# Patient Record
Sex: FEMALE | Race: ASIAN | URBAN - METROPOLITAN AREA
[De-identification: names, ages, dates, MRNs, and addresses within clinical notes are randomized per-mention and may not be internally consistent; named-entity substitution may affect disease eponyms.]

---

## 2023-03-15 ENCOUNTER — TELEPHONE (OUTPATIENT)
Dept: PRIMARY CARE | Facility: CLINIC | Age: 58
End: 2023-03-15
Payer: COMMERCIAL

## 2023-03-15 NOTE — TELEPHONE ENCOUNTER
----- Message from Teddy Pennington DO sent at 3/14/2023 10:02 AM EDT -----  Regarding: CT head  Please notify patient of normal CAT scan of the head.  ----- Message -----  From: [facility] - Radiology Results In  Sent: 3/8/2023  11:41 AM EDT  To: Teddy Pennington DO

## 2023-03-21 ENCOUNTER — TELEPHONE (OUTPATIENT)
Dept: PRIMARY CARE | Facility: CLINIC | Age: 58
End: 2023-03-21
Payer: COMMERCIAL

## 2023-03-22 NOTE — TELEPHONE ENCOUNTER
Please let her know that she needs a total of 3 shots before we check her B12 level again.  Thank you.

## 2023-03-24 ENCOUNTER — CLINICAL SUPPORT (OUTPATIENT)
Dept: PRIMARY CARE | Facility: CLINIC | Age: 58
End: 2023-03-24
Payer: COMMERCIAL

## 2023-03-24 DIAGNOSIS — E53.8 B12 DEFICIENCY: Primary | ICD-10-CM

## 2023-03-24 PROBLEM — R31.9 HEMATURIA: Status: ACTIVE | Noted: 2023-03-24

## 2023-03-24 PROBLEM — R42 VERTIGO: Status: ACTIVE | Noted: 2023-03-24

## 2023-03-24 PROBLEM — K60.2 ANAL FISSURE: Status: ACTIVE | Noted: 2023-03-24

## 2023-03-24 PROBLEM — R73.03 PREDIABETES: Status: ACTIVE | Noted: 2023-03-24

## 2023-03-24 PROBLEM — M54.2 NECK PAIN: Status: ACTIVE | Noted: 2023-03-24

## 2023-03-24 PROBLEM — J10.1 INFLUENZA B: Status: ACTIVE | Noted: 2023-03-24

## 2023-03-24 PROBLEM — M54.50 CHRONIC LOW BACK PAIN: Status: ACTIVE | Noted: 2023-03-24

## 2023-03-24 PROBLEM — R42 DIZZINESS: Status: ACTIVE | Noted: 2023-03-24

## 2023-03-24 PROBLEM — G89.29 CHRONIC LOW BACK PAIN: Status: ACTIVE | Noted: 2023-03-24

## 2023-03-24 PROBLEM — J32.9 SINUSITIS: Status: ACTIVE | Noted: 2023-03-24

## 2023-03-24 PROBLEM — K59.09 CHRONIC CONSTIPATION: Status: ACTIVE | Noted: 2023-03-24

## 2023-03-24 PROBLEM — R10.84 GENERALIZED ABDOMINAL PAIN: Status: ACTIVE | Noted: 2023-03-24

## 2023-03-24 PROBLEM — R10.32 LEFT LOWER QUADRANT ABDOMINAL PAIN OF UNKNOWN ETIOLOGY: Status: ACTIVE | Noted: 2023-03-24

## 2023-03-24 PROBLEM — T75.3XXA MOTION SICKNESS: Status: ACTIVE | Noted: 2023-03-24

## 2023-03-24 PROBLEM — H81.10 BPV (BENIGN POSITIONAL VERTIGO): Status: ACTIVE | Noted: 2023-03-24

## 2023-03-24 PROBLEM — N39.0 UTI (URINARY TRACT INFECTION): Status: ACTIVE | Noted: 2023-03-24

## 2023-03-24 PROBLEM — R53.81 MALAISE AND FATIGUE: Status: ACTIVE | Noted: 2023-03-24

## 2023-03-24 PROBLEM — M77.8 TENDINITIS OF RIGHT WRIST: Status: ACTIVE | Noted: 2023-03-24

## 2023-03-24 PROBLEM — I83.819 VARICOSE VEINS WITH PAIN: Status: ACTIVE | Noted: 2023-03-24

## 2023-03-24 PROBLEM — R53.83 MALAISE AND FATIGUE: Status: ACTIVE | Noted: 2023-03-24

## 2023-03-24 PROBLEM — H93.19 TINNITUS: Status: ACTIVE | Noted: 2023-03-24

## 2023-03-24 PROBLEM — N76.0 VAGINITIS: Status: ACTIVE | Noted: 2023-03-24

## 2023-03-24 PROBLEM — M79.662 PAIN AND SWELLING OF LEFT LOWER LEG: Status: ACTIVE | Noted: 2023-03-24

## 2023-03-24 PROBLEM — I45.10 INCOMPLETE RIGHT BUNDLE BRANCH BLOCK (RBBB): Status: ACTIVE | Noted: 2023-03-24

## 2023-03-24 PROBLEM — R26.89 IMBALANCE: Status: ACTIVE | Noted: 2023-03-24

## 2023-03-24 PROBLEM — M79.89 PAIN AND SWELLING OF LEFT LOWER LEG: Status: ACTIVE | Noted: 2023-03-24

## 2023-03-24 PROBLEM — K64.4 HEMORRHOIDAL SKIN TAG: Status: ACTIVE | Noted: 2023-03-24

## 2023-03-24 PROBLEM — R73.9 HYPERGLYCEMIA: Status: ACTIVE | Noted: 2023-03-24

## 2023-03-24 PROBLEM — R00.2 PALPITATION: Status: ACTIVE | Noted: 2023-03-24

## 2023-03-24 PROBLEM — E55.9 VITAMIN D DEFICIENCY: Status: ACTIVE | Noted: 2023-03-24

## 2023-03-24 PROBLEM — E78.5 HYPERLIPIDEMIA: Status: ACTIVE | Noted: 2023-03-24

## 2023-03-24 PROBLEM — M25.531 RIGHT WRIST PAIN: Status: ACTIVE | Noted: 2023-03-24

## 2023-03-24 PROBLEM — J34.89 RHINORRHEA: Status: ACTIVE | Noted: 2023-03-24

## 2023-03-24 PROBLEM — R60.0 BILATERAL LOWER EXTREMITY EDEMA: Status: ACTIVE | Noted: 2023-03-24

## 2023-03-24 PROBLEM — M25.512 LEFT SHOULDER PAIN: Status: ACTIVE | Noted: 2023-03-24

## 2023-03-24 PROBLEM — M79.89 SWELLING OF LEFT LOWER EXTREMITY: Status: ACTIVE | Noted: 2023-03-24

## 2023-03-24 PROBLEM — M79.651 PAIN OF RIGHT LATERAL UPPER THIGH: Status: ACTIVE | Noted: 2023-03-24

## 2023-03-24 PROBLEM — E03.9 HYPOTHYROIDISM: Status: ACTIVE | Noted: 2023-03-24

## 2023-03-24 PROBLEM — E61.1 IRON DEFICIENCY: Status: ACTIVE | Noted: 2023-03-24

## 2023-03-24 PROBLEM — R50.9 FEVER: Status: ACTIVE | Noted: 2023-03-24

## 2023-03-24 PROCEDURE — 96372 THER/PROPH/DIAG INJ SC/IM: CPT | Performed by: FAMILY MEDICINE

## 2023-03-24 RX ORDER — LEVOTHYROXINE SODIUM 75 UG/1
75 TABLET ORAL
COMMUNITY
Start: 2015-01-02 | End: 2023-07-06

## 2023-03-24 RX ORDER — CYANOCOBALAMIN 1000 UG/ML
1000 INJECTION, SOLUTION INTRAMUSCULAR; SUBCUTANEOUS ONCE
Status: COMPLETED | OUTPATIENT
Start: 2023-03-24 | End: 2023-03-24

## 2023-03-24 RX ORDER — TIZANIDINE 2 MG/1
2 TABLET ORAL NIGHTLY
COMMUNITY
Start: 2023-03-02 | End: 2023-11-02 | Stop reason: ALTCHOICE

## 2023-03-24 RX ORDER — HYDROXOCOBALAMIN 1000 UG/ML
INJECTION, SOLUTION INTRAMUSCULAR
COMMUNITY
Start: 2015-01-16 | End: 2023-11-02 | Stop reason: ALTCHOICE

## 2023-03-24 RX ORDER — MECLIZINE HYDROCHLORIDE 25 MG/1
25 TABLET ORAL EVERY 6 HOURS PRN
COMMUNITY
Start: 2023-02-03 | End: 2023-11-02 | Stop reason: ALTCHOICE

## 2023-03-24 RX ORDER — CYCLOSPORINE 0.5 MG/ML
1 EMULSION OPHTHALMIC 2 TIMES DAILY
COMMUNITY
Start: 2013-01-16

## 2023-03-24 RX ORDER — LEVOTHYROXINE SODIUM 50 UG/1
50 TABLET ORAL
COMMUNITY
Start: 2020-09-30 | End: 2023-08-02 | Stop reason: ALTCHOICE

## 2023-03-24 RX ORDER — ATORVASTATIN CALCIUM 20 MG/1
1 TABLET, FILM COATED ORAL EVERY EVENING
COMMUNITY
Start: 2017-07-07 | End: 2023-05-04

## 2023-03-24 RX ADMIN — CYANOCOBALAMIN 1000 MCG: 1000 INJECTION, SOLUTION INTRAMUSCULAR; SUBCUTANEOUS at 10:36

## 2023-03-24 NOTE — PROGRESS NOTES
Keyana Trent presented in office for b12 #2 nurse visit. Pt tolerated well, no side effects. Overseeing provider was Dr. Pennington.

## 2023-03-27 ENCOUNTER — APPOINTMENT (OUTPATIENT)
Dept: PRIMARY CARE | Facility: CLINIC | Age: 58
End: 2023-03-27
Payer: COMMERCIAL

## 2023-03-31 ENCOUNTER — OFFICE VISIT (OUTPATIENT)
Dept: PRIMARY CARE | Facility: CLINIC | Age: 58
End: 2023-03-31
Payer: COMMERCIAL

## 2023-03-31 VITALS
TEMPERATURE: 97.3 F | HEART RATE: 65 BPM | OXYGEN SATURATION: 96 % | BODY MASS INDEX: 23.23 KG/M2 | SYSTOLIC BLOOD PRESSURE: 127 MMHG | DIASTOLIC BLOOD PRESSURE: 82 MMHG | WEIGHT: 127 LBS | RESPIRATION RATE: 18 BRPM

## 2023-03-31 DIAGNOSIS — E01.0 THYROMEGALY: Primary | ICD-10-CM

## 2023-03-31 PROCEDURE — 99213 OFFICE O/P EST LOW 20 MIN: CPT | Performed by: FAMILY MEDICINE

## 2023-03-31 NOTE — PROGRESS NOTES
Subjective   Patient ID: Keyana Trent is a 57 y.o. female who presents for Edema (Left side collar bone swelling since this morning,not painful but noticeable).    HPI   Patient comes in today for evaluation of swelling in the base of the left side of her neck.  She noticed it this morning.  She became concerned and wanted to be seen.  She denies any pain, swelling or ecchymosis in the area.  She otherwise is without complaints.    3/1/2023  Patient comes in today with her  for follow-up on her vertigo. Is been present now for 2 weeks and comes and goes. She does get some relief medication with the Epley maneuver but it keeps coming back. Her lab work was negative except for the low B12 which she is replacing. She had another fall this morning due to the dizziness observed by her . She feels it the most in the morning and she has to take everything very slow. She has not had this (vertigo) before.    2/14/2023  Patient comes in today complaining of dizziness. This has been going on and off now for a month. She was seen in the urgent care last month and was told she had an inner ear infection. She was given treatment and it did seem to improve. It returned however the beginning of this month. She saw Dr. Zendejas and had some lab work done and was given meclizine which has not helped. She got bloodwork done but has not gotten results. She describes her current dizziness feeling as occurring for 30 to 60 seconds after changing positions. In describing it further it is almost classic for benign positional vertigo. She had occurred again this morning as she got up out of bed which is what prompted her visit here today.  Review of Systems   All other systems reviewed and are negative.      Objective   /82   Pulse 65   Temp 36.3 °C (97.3 °F)   Resp 18   Wt 57.6 kg (127 lb)   LMP  (LMP Unknown)   SpO2 96%   BMI 23.23 kg/m²     Physical Exam  Constitutional:       Appearance: She is well-developed.    HENT:      Head: Normocephalic and atraumatic.      Right Ear: Tympanic membrane, ear canal and external ear normal.      Left Ear: Tympanic membrane, ear canal and external ear normal.      Nose: Nose normal.      Mouth/Throat:      Mouth: Mucous membranes are moist.      Pharynx: Oropharynx is clear.   Eyes:      Extraocular Movements: Extraocular movements intact.      Conjunctiva/sclera: Conjunctivae normal.      Pupils: Pupils are equal, round, and reactive to light.   Neck:      Comments: There is no visible swelling or palpable swelling that I can find in the area of patient's concern.  She is insistent that it is present.  Cardiovascular:      Rate and Rhythm: Normal rate and regular rhythm.      Heart sounds: Normal heart sounds. No murmur heard.  Pulmonary:      Effort: Pulmonary effort is normal.      Breath sounds: Normal breath sounds. No wheezing.   Abdominal:      General: Abdomen is flat. Bowel sounds are normal.      Palpations: Abdomen is soft.   Musculoskeletal:         General: Normal range of motion.      Cervical back: Normal range of motion and neck supple.   Skin:     General: Skin is warm and dry.   Neurological:      General: No focal deficit present.      Mental Status: She is alert and oriented to person, place, and time. Mental status is at baseline.   Psychiatric:         Mood and Affect: Mood normal.         Behavior: Behavior normal.         Thought Content: Thought content normal.         Judgment: Judgment normal.         Assessment/Plan   Problem List Items Addressed This Visit       Thyromegaly - Primary    Relevant Orders    US thyroid (Completed)   Ultrasound of the thyroid.  We will contact patient with results.  Return to clinic in the near future for complete physical exam and fasting labs.  Medication list reconciled.  Thank you for visiting today!      Professional services: Some of this note was completed using Dragon voice technology and sometimes the software  misinterprets words. This may include unintended errors with respect to translation of words, typographical errors or grammar errors which may not have been identified prior to finalization of the chart note. Please take this into account when reading the note. Thank you.

## 2023-04-03 ENCOUNTER — TELEPHONE (OUTPATIENT)
Dept: PRIMARY CARE | Facility: CLINIC | Age: 58
End: 2023-04-03
Payer: COMMERCIAL

## 2023-04-03 NOTE — TELEPHONE ENCOUNTER
----- Message from Teddy Pennington DO sent at 4/3/2023  8:20 AM EDT -----  Regarding: US Thyroid  Please notify patient of negative ultrasound.  Follow-up in the office in 1 month if symptoms continue, sooner if condition worsens.  ----- Message -----  From: [facility] [facility] - Radiology Results In  Sent: 4/1/2023   9:34 AM EDT  To: Teddy Pennington DO

## 2023-04-28 ENCOUNTER — LAB (OUTPATIENT)
Dept: LAB | Facility: LAB | Age: 58
End: 2023-04-28
Payer: COMMERCIAL

## 2023-04-28 ENCOUNTER — OFFICE VISIT (OUTPATIENT)
Dept: PRIMARY CARE | Facility: CLINIC | Age: 58
End: 2023-04-28
Payer: COMMERCIAL

## 2023-04-28 VITALS
HEART RATE: 65 BPM | WEIGHT: 126 LBS | DIASTOLIC BLOOD PRESSURE: 94 MMHG | BODY MASS INDEX: 23.05 KG/M2 | TEMPERATURE: 97.7 F | SYSTOLIC BLOOD PRESSURE: 151 MMHG | RESPIRATION RATE: 16 BRPM | OXYGEN SATURATION: 97 %

## 2023-04-28 DIAGNOSIS — Z00.00 ENCOUNTER FOR HEALTH MAINTENANCE EXAMINATION: ICD-10-CM

## 2023-04-28 DIAGNOSIS — E53.8 VITAMIN B 12 DEFICIENCY: ICD-10-CM

## 2023-04-28 DIAGNOSIS — R73.9 HYPERGLYCEMIA: ICD-10-CM

## 2023-04-28 DIAGNOSIS — E78.5 HYPERLIPIDEMIA, UNSPECIFIED HYPERLIPIDEMIA TYPE: ICD-10-CM

## 2023-04-28 DIAGNOSIS — E53.8 VITAMIN B 12 DEFICIENCY: Primary | ICD-10-CM

## 2023-04-28 DIAGNOSIS — Z83.3 FAMILY HISTORY OF DIABETES MELLITUS (DM): ICD-10-CM

## 2023-04-28 DIAGNOSIS — E55.9 VITAMIN D DEFICIENCY: ICD-10-CM

## 2023-04-28 DIAGNOSIS — E04.2 MULTIPLE THYROID NODULES: ICD-10-CM

## 2023-04-28 LAB
ALANINE AMINOTRANSFERASE (SGPT) (U/L) IN SER/PLAS: 33 U/L (ref 7–45)
ALBUMIN (G/DL) IN SER/PLAS: 4.5 G/DL (ref 3.4–5)
ALKALINE PHOSPHATASE (U/L) IN SER/PLAS: 101 U/L (ref 33–110)
ANION GAP IN SER/PLAS: 11 MMOL/L (ref 10–20)
ASPARTATE AMINOTRANSFERASE (SGOT) (U/L) IN SER/PLAS: 20 U/L (ref 9–39)
BILIRUBIN TOTAL (MG/DL) IN SER/PLAS: 0.5 MG/DL (ref 0–1.2)
CALCIUM (MG/DL) IN SER/PLAS: 9.3 MG/DL (ref 8.6–10.3)
CARBON DIOXIDE, TOTAL (MMOL/L) IN SER/PLAS: 29 MMOL/L (ref 21–32)
CHLORIDE (MMOL/L) IN SER/PLAS: 105 MMOL/L (ref 98–107)
CHOLESTEROL (MG/DL) IN SER/PLAS: 180 MG/DL (ref 0–199)
CHOLESTEROL IN HDL (MG/DL) IN SER/PLAS: 47.5 MG/DL
CHOLESTEROL/HDL RATIO: 3.8
COBALAMIN (VITAMIN B12) (PG/ML) IN SER/PLAS: 3231 PG/ML (ref 211–911)
CREATININE (MG/DL) IN SER/PLAS: 0.69 MG/DL (ref 0.5–1.05)
ERYTHROCYTE DISTRIBUTION WIDTH (RATIO) BY AUTOMATED COUNT: 13.2 % (ref 11.5–14.5)
ERYTHROCYTE MEAN CORPUSCULAR HEMOGLOBIN CONCENTRATION (G/DL) BY AUTOMATED: 32 G/DL (ref 32–36)
ERYTHROCYTE MEAN CORPUSCULAR VOLUME (FL) BY AUTOMATED COUNT: 94 FL (ref 80–100)
ERYTHROCYTES (10*6/UL) IN BLOOD BY AUTOMATED COUNT: 4.66 X10E12/L (ref 4–5.2)
ESTIMATED AVERAGE GLUCOSE FOR HBA1C: 128 MG/DL
GFR FEMALE: >90 ML/MIN/1.73M2
GLUCOSE (MG/DL) IN SER/PLAS: 95 MG/DL (ref 74–99)
HEMATOCRIT (%) IN BLOOD BY AUTOMATED COUNT: 43.7 % (ref 36–46)
HEMOGLOBIN (G/DL) IN BLOOD: 14 G/DL (ref 12–16)
HEMOGLOBIN A1C/HEMOGLOBIN TOTAL IN BLOOD: 6.1 %
LDL: 105 MG/DL (ref 0–99)
LEUKOCYTES (10*3/UL) IN BLOOD BY AUTOMATED COUNT: 5.2 X10E9/L (ref 4.4–11.3)
PLATELETS (10*3/UL) IN BLOOD AUTOMATED COUNT: 307 X10E9/L (ref 150–450)
POTASSIUM (MMOL/L) IN SER/PLAS: 4.2 MMOL/L (ref 3.5–5.3)
PROTEIN TOTAL: 7.6 G/DL (ref 6.4–8.2)
SODIUM (MMOL/L) IN SER/PLAS: 141 MMOL/L (ref 136–145)
TRIGLYCERIDE (MG/DL) IN SER/PLAS: 140 MG/DL (ref 0–149)
UREA NITROGEN (MG/DL) IN SER/PLAS: 11 MG/DL (ref 6–23)
VLDL: 28 MG/DL (ref 0–40)

## 2023-04-28 PROCEDURE — 99396 PREV VISIT EST AGE 40-64: CPT | Performed by: FAMILY MEDICINE

## 2023-04-28 PROCEDURE — 80053 COMPREHEN METABOLIC PANEL: CPT

## 2023-04-28 PROCEDURE — 85027 COMPLETE CBC AUTOMATED: CPT

## 2023-04-28 PROCEDURE — 1036F TOBACCO NON-USER: CPT | Performed by: FAMILY MEDICINE

## 2023-04-28 PROCEDURE — 80061 LIPID PANEL: CPT

## 2023-04-28 PROCEDURE — 82607 VITAMIN B-12: CPT

## 2023-04-28 PROCEDURE — 83036 HEMOGLOBIN GLYCOSYLATED A1C: CPT

## 2023-04-28 PROCEDURE — 96372 THER/PROPH/DIAG INJ SC/IM: CPT | Performed by: FAMILY MEDICINE

## 2023-04-28 PROCEDURE — 36415 COLL VENOUS BLD VENIPUNCTURE: CPT

## 2023-04-28 RX ORDER — CYANOCOBALAMIN 1000 UG/ML
1000 INJECTION, SOLUTION INTRAMUSCULAR; SUBCUTANEOUS ONCE
Status: COMPLETED | OUTPATIENT
Start: 2023-04-28 | End: 2023-04-28

## 2023-04-28 RX ADMIN — CYANOCOBALAMIN 1000 MCG: 1000 INJECTION, SOLUTION INTRAMUSCULAR; SUBCUTANEOUS at 14:04

## 2023-04-28 NOTE — PROGRESS NOTES
Subjective   Patient ID: Keyana Trent is a 57 y.o. female who presents for Annual Exam (Fasting for labs. ).    HPI   Patient comes in today for annual physical exam.  Currently everything is stable.  The swelling at the base of the left side of her neck is currently not there.  He would still like to see endocrinology.  She is otherwise without complaints.    Her blood pressure is elevated in the office today.  I have asked her to keep track of this at home several times a week she states her  has a home monitor.        3/31/2023  Patient comes in today for evaluation of swelling in the base of the left side of her neck.  She noticed it this morning.  She became concerned and wanted to be seen.  She denies any pain, swelling or ecchymosis in the area.  She otherwise is without complaints.    3/1/2023  Patient comes in today with her  for follow-up on her vertigo. Is been present now for 2 weeks and comes and goes. She does get some relief medication with the Epley maneuver but it keeps coming back. Her lab work was negative except for the low B12 which she is replacing. She had another fall this morning due to the dizziness observed by her . She feels it the most in the morning and she has to take everything very slow. She has not had this (vertigo) before.    2/14/2023  Patient comes in today complaining of dizziness. This has been going on and off now for a month. She was seen in the urgent care last month and was told she had an inner ear infection. She was given treatment and it did seem to improve. It returned however the beginning of this month. She saw Dr. Zendejas and had some lab work done and was given meclizine which has not helped. She got bloodwork done but has not gotten results. She describes her current dizziness feeling as occurring for 30 to 60 seconds after changing positions. In describing it further it is almost classic for benign positional vertigo. She had occurred again  this morning as she got up out of bed which is what prompted her visit here today.    2/13/2023  Patient comes in today for follow-up on her blood pressure. It is very high today. Patient claims she does not take it at home because she does not want to get herself upset. She is otherwise without complaints. She denies any symptoms to the high blood pressure today.    1/13/2023  Patient returns today for follow-up on her blood pressure. It continues to be elevated. She continues to not have any symptoms or side effects from the medicine or the blood pressure.    12/12/2022  Patient comes in today for checkup and refill of medications. She has noticed during recent visits with other specialist that her blood pressure has been running on the high side. She is not symptomatic with headache or other symptoms but it is indeed high. On my recheck it was 170/96.    3/3/2022  Patient comes in today still complaining of right leg pain and for Medicare wellness exam. She currently is not having any other complaints.    1/10/2022  Patient comes in today for reevaluation of her right hip. Last month on 12/14/2021 she took a bath in the evening and got out of the tub and the next morning she could barely walk. She thought she may have displaced her right hip replacement. She was seen in the urgent care at [facility] on 12/15/2021 and after x-ray and evaluation was told she had a pulled muscle. She was given lidocaine gel to use but did not get it because it was too expensive.    She comes in today for follow-up. She states that the discomfort is improved but not yet gone. She still is complaining of discomfort in the lateral right hip.    8/16/2021  Patient comes in today for Medicare wellness exam. She is currently without complaints. She just had an enjoyable time in [place] with her Sister Padma.    6/11/2021  Patient presents today for 6-month checkup and refill of meds. She currently is without complaints. She states that  "she is taking her medicines as directed and is not having any side effects from them.    She has had a problem arise since she was here last. She has had some uterine bleeding. She is scheduled to go on this coming Monday for a biopsy. She otherwise states her blood pressures, sugars and cholesterol have been doing well.    9/24/2020  Patient comes in today complaining of dizziness. The symptoms have been going on for a few weeks. He states that when the seasons change she seems to get dizzy. She describes her symptoms as when she bends over or gets up she seems to have the dizziness. It just lasts for a few seconds to a few minutes. She also has pressure under her eyes when she wakes up in the morning. When she turns her head she gets dizzy. She had her flu vaccine last week.    8/11/2020  Patient presents today for Medicare wellness exam. She currently is without complaints. She states that she is taking her medicines as directed and is not having any side effects from them.    6/18/2020  Patient presents today for checkup and refill of meds. She currently is without complaints. She states that she is taking her medicines as directed and is not having any side effects from them. He was last here last year the month before her  passed away. She has been doing okay since then with good family support.    8/8/19  Medicare wellness exam    1/28/2019 3:29 PM)  The patient is a 72 year old female who presents with cold symptoms. Symptoms include nasal congestion, headache, cough, sneezing and hoarseness. The episodes last for 1 week. Current treatment includes OTC meds, Ibuprofen and acetaminophen. Note for \"Cold symptoms\": Patient comes in today complaining of cold symptoms that started last Wednesday.  The symptoms began in her head then moved into her throat and now down into her chest.   She has had some nosebleeds especially in the right nostril.  She denies fever or GI symptoms.    12/4/2018   The patient " "is a 72 year old female who presents with a complaint of lump(s). The lump(s) has been occurring for 2 weeks. The location of the lump(s) is the upper extremity (right shoulder). There has been pain (Pt said she has some pain if she moves her arm a certain way.). Note for \"Lump(s)\": Patient comes in today complaining of pain in the right shoulder with certain types of movements. She denies any trauma or injury to the shoulder.  She does have a handicapped  whom she has to help frequently with ambulation and getting up out of a chair.  They do have a lift chair but apparently her  never uses it.    7/31/2018   The patient is a 71 year old female here for follow up. The patient is here to discuss an ER visit. Note for \"Follow up\": patient comes in today for follow-up on her poison ivy. She was in the ER on 7/25/18 after pulling weeds in her backyard.  She didn't think she had any poison ivy back there but then she found it along her neighbor's fence. in the eR she was given a Medrol Dosepak and instructed to follow-up here.  She apparently had significant swelling of her face which has improved but is still present.     Review of Systems   All other systems reviewed and are negative.      Objective   BP (!) 151/94   Pulse 65   Temp 36.5 °C (97.7 °F)   Resp 16   Wt 57.2 kg (126 lb)   LMP  (LMP Unknown)   SpO2 97%   BMI 23.05 kg/m²     Physical Exam  Vitals reviewed.   Constitutional:       Appearance: She is well-developed.   HENT:      Head: Normocephalic and atraumatic.      Right Ear: Tympanic membrane, ear canal and external ear normal.      Left Ear: Tympanic membrane, ear canal and external ear normal.      Nose: Nose normal.      Mouth/Throat:      Mouth: Mucous membranes are moist.      Pharynx: Oropharynx is clear.   Eyes:      Extraocular Movements: Extraocular movements intact.      Conjunctiva/sclera: Conjunctivae normal.      Pupils: Pupils are equal, round, and reactive to light. "   Cardiovascular:      Rate and Rhythm: Normal rate and regular rhythm.      Heart sounds: Normal heart sounds. No murmur heard.  Pulmonary:      Effort: Pulmonary effort is normal.      Breath sounds: Normal breath sounds. No wheezing.   Abdominal:      General: Abdomen is flat. Bowel sounds are normal.      Palpations: Abdomen is soft.   Musculoskeletal:         General: Normal range of motion.   Skin:     General: Skin is warm and dry.   Neurological:      General: No focal deficit present.      Mental Status: She is alert and oriented to person, place, and time. Mental status is at baseline.   Psychiatric:         Mood and Affect: Mood normal.         Behavior: Behavior normal.         Thought Content: Thought content normal.         Judgment: Judgment normal.         Assessment/Plan   Problem List Items Addressed This Visit       Hyperglycemia    Hyperlipidemia    Relevant Orders    Lipid Panel    Vitamin B 12 deficiency    Relevant Orders    CBC    Vitamin B12    Vitamin D deficiency    Encounter for health maintenance examination - Primary    Relevant Orders    Comprehensive Metabolic Panel    Family history of diabetes mellitus (DM)    Relevant Orders    Hemoglobin A1C

## 2023-05-01 ENCOUNTER — TELEPHONE (OUTPATIENT)
Dept: PRIMARY CARE | Facility: CLINIC | Age: 58
End: 2023-05-01
Payer: COMMERCIAL

## 2023-05-01 NOTE — TELEPHONE ENCOUNTER
----- Message from Teddy Pennington DO sent at 5/1/2023  7:31 AM EDT -----  Regarding: Labs  Please notify patient of very high B12 of 3231!  Stop B12 supplements for now and recheck in August 23.    Cholesterol panel continues to improve, continue Lipitor daily and continue to watch cholesterol and fats in the diet.    Blood sugar is rising.  Watch sugar, carbs and starches in the diet.    All the rest of the labs are good.  Continue current medicines and recheck in 1 year.  ----- Message -----  From: Lab, Background User  Sent: 4/28/2023   4:45 PM EDT  To: Teddy Pennington DO

## 2023-05-02 DIAGNOSIS — E78.5 HYPERLIPIDEMIA, UNSPECIFIED HYPERLIPIDEMIA TYPE: Primary | ICD-10-CM

## 2023-05-04 RX ORDER — ATORVASTATIN CALCIUM 20 MG/1
20 TABLET, FILM COATED ORAL EVERY EVENING
Qty: 90 TABLET | Refills: 1 | Status: SHIPPED | OUTPATIENT
Start: 2023-05-04 | End: 2023-10-30

## 2023-05-04 NOTE — TELEPHONE ENCOUNTER
Requested Prescriptions     Pending Prescriptions Disp Refills    atorvastatin (Lipitor) 20 mg tablet [Pharmacy Med Name: ATORVASTATIN TABS 20MG] 90 tablet 1     Sig: Take 1 tablet (20 mg) by mouth once daily in the evening.

## 2023-05-30 ENCOUNTER — CLINICAL SUPPORT (OUTPATIENT)
Dept: PRIMARY CARE | Facility: CLINIC | Age: 58
End: 2023-05-30
Payer: COMMERCIAL

## 2023-05-30 DIAGNOSIS — E53.8 VITAMIN B 12 DEFICIENCY: Primary | ICD-10-CM

## 2023-05-30 PROCEDURE — 96372 THER/PROPH/DIAG INJ SC/IM: CPT | Performed by: FAMILY MEDICINE

## 2023-05-30 RX ORDER — CYANOCOBALAMIN 1000 UG/ML
1000 INJECTION, SOLUTION INTRAMUSCULAR; SUBCUTANEOUS ONCE
Status: COMPLETED | OUTPATIENT
Start: 2023-05-30 | End: 2023-05-30

## 2023-05-30 RX ADMIN — CYANOCOBALAMIN 1000 MCG: 1000 INJECTION, SOLUTION INTRAMUSCULAR; SUBCUTANEOUS at 14:49

## 2023-05-30 NOTE — PROGRESS NOTES
Keyana Trent presented in office for b12 nurse visit. Pt tolerated well, no side effects. Overseeing provider was .  Was advised to stop b12 shots and supplements, as she was told 5/1. Provider recommends recheck in 4 mos -Oct 23

## 2023-07-05 DIAGNOSIS — E03.9 HYPOTHYROIDISM, UNSPECIFIED TYPE: ICD-10-CM

## 2023-07-06 RX ORDER — LEVOTHYROXINE SODIUM 75 UG/1
75 TABLET ORAL EVERY OTHER DAY
Qty: 45 TABLET | Refills: 1 | Status: SHIPPED | OUTPATIENT
Start: 2023-07-06 | End: 2023-08-02 | Stop reason: SDUPTHER

## 2023-07-06 NOTE — TELEPHONE ENCOUNTER
Requested Prescriptions     Pending Prescriptions Disp Refills    levothyroxine (Synthroid, Levoxyl) 75 mcg tablet [Pharmacy Med Name: L-THYROXINE (SYNTHROID) TABS 75MCG] 45 tablet 1     Sig: Take 1 tablet (75 mcg) by mouth every other day.

## 2023-07-27 ENCOUNTER — OFFICE VISIT (OUTPATIENT)
Dept: PRIMARY CARE | Facility: CLINIC | Age: 58
End: 2023-07-27
Payer: COMMERCIAL

## 2023-07-27 VITALS
OXYGEN SATURATION: 96 % | BODY MASS INDEX: 24.8 KG/M2 | TEMPERATURE: 97.5 F | SYSTOLIC BLOOD PRESSURE: 143 MMHG | WEIGHT: 127 LBS | RESPIRATION RATE: 16 BRPM | DIASTOLIC BLOOD PRESSURE: 88 MMHG | HEART RATE: 69 BPM

## 2023-07-27 DIAGNOSIS — E53.8 VITAMIN B 12 DEFICIENCY: Primary | ICD-10-CM

## 2023-07-27 DIAGNOSIS — R73.03 PREDIABETES: ICD-10-CM

## 2023-07-27 DIAGNOSIS — E55.9 VITAMIN D DEFICIENCY: ICD-10-CM

## 2023-07-27 DIAGNOSIS — R59.0 SUBMANDIBULAR LYMPHADENOPATHY: ICD-10-CM

## 2023-07-27 DIAGNOSIS — E01.0 THYROMEGALY: ICD-10-CM

## 2023-07-27 PROCEDURE — 99214 OFFICE O/P EST MOD 30 MIN: CPT | Performed by: FAMILY MEDICINE

## 2023-07-27 NOTE — PROGRESS NOTES
Subjective   Patient ID: Keyana Trent is a 58 y.o. female who presents for Vertigo (Working in the garden felt a little dizzy) and Mass (On the chest on the sternum).    HPI   Patient comes in today complaining of appearance of lumps in her neck again along with vertigo again.  She has had a complete evaluation for the vertigo in March 2023 with neurology and ENT.  She was diagnosed with BPV and prescribed vestibular therapy.  She also had a C-spine x-ray which showed mild spondylosis of C6/7.  She did go to physical therapy for 9 visits between 3/23 and 5/23.  She also had an ultrasound of her thyroid on 3/31/2023 which was essentially negative.    She comes in today complaining of the lumps and has pictures on her phone.  It appears she has a submandibular node swelling on the right which was larger on Monday of this week than it is today, Thursday.  It is nontender and she denies any oral pathology.    4/28/2023  Patient comes in today for annual physical exam.  Currently everything is stable.  The swelling at the base of the left side of her neck is currently not there.  He would still like to see endocrinology.  She is otherwise without complaints.    Her blood pressure is elevated in the office today.  I have asked her to keep track of this at home several times a week she states her  has a home monitor.    Review of Systems   All other systems reviewed and are negative.      Objective   /88   Pulse 69   Temp 36.4 °C (97.5 °F)   Resp 16   Wt 57.6 kg (127 lb)   LMP  (LMP Unknown)   SpO2 96%   BMI 24.80 kg/m²     Physical Exam  Vitals reviewed.   Constitutional:       Appearance: She is well-developed.   HENT:      Head: Normocephalic and atraumatic.      Right Ear: Tympanic membrane, ear canal and external ear normal.      Left Ear: Tympanic membrane, ear canal and external ear normal.      Nose: Nose normal.      Mouth/Throat:      Mouth: Mucous membranes are moist.      Pharynx: Oropharynx  is clear.   Eyes:      Extraocular Movements: Extraocular movements intact.      Conjunctiva/sclera: Conjunctivae normal.      Pupils: Pupils are equal, round, and reactive to light.   Neck:      Comments: Mild submandibular swelling on the right  Cardiovascular:      Rate and Rhythm: Normal rate and regular rhythm.      Heart sounds: Normal heart sounds. No murmur heard.  Pulmonary:      Effort: Pulmonary effort is normal.      Breath sounds: Normal breath sounds. No wheezing.   Abdominal:      General: Abdomen is flat. Bowel sounds are normal.      Palpations: Abdomen is soft.   Musculoskeletal:         General: Normal range of motion.   Skin:     General: Skin is warm and dry.   Neurological:      General: No focal deficit present.      Mental Status: She is alert and oriented to person, place, and time. Mental status is at baseline.   Psychiatric:         Mood and Affect: Mood normal.         Behavior: Behavior normal.         Thought Content: Thought content normal.         Judgment: Judgment normal.         Assessment/Plan   Problem List Items Addressed This Visit       Prediabetes    Relevant Orders    Hemoglobin A1C    Vitamin B 12 deficiency - Primary    Relevant Orders    Vitamin B12    Vitamin D deficiency    Relevant Orders    Vitamin D, Total    Thyromegaly    Relevant Orders    TSH with reflex to Free T4 if abnormal     Other Visit Diagnoses       Submandibular lymphadenopathy        Relevant Orders    CBC        Will contact patient with lab results when available.  Medication list reconciled.  Thank you for visiting today!      Professional services: Some of this note was completed using Dragon voice technology and sometimes the software misinterprets words. This may include unintended errors with respect to translation of words, typographical errors or grammar errors which may not have been identified prior to finalization of the chart note. Please take this into account when reading the note. Thank  you.

## 2023-07-28 ENCOUNTER — LAB (OUTPATIENT)
Dept: LAB | Facility: LAB | Age: 58
End: 2023-07-28
Payer: COMMERCIAL

## 2023-07-28 ENCOUNTER — TELEPHONE (OUTPATIENT)
Dept: PRIMARY CARE | Facility: CLINIC | Age: 58
End: 2023-07-28

## 2023-07-28 DIAGNOSIS — E01.0 THYROMEGALY: ICD-10-CM

## 2023-07-28 DIAGNOSIS — E53.8 VITAMIN B 12 DEFICIENCY: ICD-10-CM

## 2023-07-28 DIAGNOSIS — R73.03 PREDIABETES: ICD-10-CM

## 2023-07-28 DIAGNOSIS — E55.9 VITAMIN D DEFICIENCY: ICD-10-CM

## 2023-07-28 DIAGNOSIS — R59.0 SUBMANDIBULAR LYMPHADENOPATHY: ICD-10-CM

## 2023-07-28 LAB
CALCIDIOL (25 OH VITAMIN D3) (NG/ML) IN SER/PLAS: 29 NG/ML
COBALAMIN (VITAMIN B12) (PG/ML) IN SER/PLAS: 592 PG/ML (ref 211–911)
ERYTHROCYTE DISTRIBUTION WIDTH (RATIO) BY AUTOMATED COUNT: 12.4 % (ref 11.5–14.5)
ERYTHROCYTE MEAN CORPUSCULAR HEMOGLOBIN CONCENTRATION (G/DL) BY AUTOMATED: 32.3 G/DL (ref 32–36)
ERYTHROCYTE MEAN CORPUSCULAR VOLUME (FL) BY AUTOMATED COUNT: 92 FL (ref 80–100)
ERYTHROCYTES (10*6/UL) IN BLOOD BY AUTOMATED COUNT: 4.49 X10E12/L (ref 4–5.2)
ESTIMATED AVERAGE GLUCOSE FOR HBA1C: 128 MG/DL
HEMATOCRIT (%) IN BLOOD BY AUTOMATED COUNT: 41.2 % (ref 36–46)
HEMOGLOBIN (G/DL) IN BLOOD: 13.3 G/DL (ref 12–16)
HEMOGLOBIN A1C/HEMOGLOBIN TOTAL IN BLOOD: 6.1 %
LEUKOCYTES (10*3/UL) IN BLOOD BY AUTOMATED COUNT: 6.4 X10E9/L (ref 4.4–11.3)
PLATELETS (10*3/UL) IN BLOOD AUTOMATED COUNT: 319 X10E9/L (ref 150–450)
THYROTROPIN (MIU/L) IN SER/PLAS BY DETECTION LIMIT <= 0.05 MIU/L: 4.27 MIU/L (ref 0.44–3.98)
THYROXINE (T4) FREE (NG/DL) IN SER/PLAS: 0.86 NG/DL (ref 0.61–1.12)

## 2023-07-28 PROCEDURE — 83036 HEMOGLOBIN GLYCOSYLATED A1C: CPT

## 2023-07-28 PROCEDURE — 84443 ASSAY THYROID STIM HORMONE: CPT

## 2023-07-28 PROCEDURE — 82306 VITAMIN D 25 HYDROXY: CPT

## 2023-07-28 PROCEDURE — 85027 COMPLETE CBC AUTOMATED: CPT

## 2023-07-28 PROCEDURE — 36415 COLL VENOUS BLD VENIPUNCTURE: CPT

## 2023-07-28 PROCEDURE — 82607 VITAMIN B-12: CPT

## 2023-07-28 PROCEDURE — 84439 ASSAY OF FREE THYROXINE: CPT

## 2023-07-28 NOTE — TELEPHONE ENCOUNTER
Patient states the she is extremely tired and does not know what is going on.  Patient did state that she would like to go over the lab results with the doctor as soon as they come back.  Patient can be reached at [phone].    Thank You

## 2023-07-31 NOTE — TELEPHONE ENCOUNTER
Patient called and she would like to go over the results of her labs with the doctor.  Patient can be reached at [phone].    Thank You

## 2023-07-31 NOTE — TELEPHONE ENCOUNTER
I tried to reach patient on 2 occasions this evening and only got her voicemail.  I will try and reach her again on Tuesday, 8/1/2023.    Please notify patient that her hemoglobin A1c is stable in the prediabetic range at 6.1, same as it was 3 months ago.      Her vitamin D level was slightly low at 29.  It should be between 30 and 100 the closer to 50 the better. It is an important vitamin for your heart, lungs, immune system and bones among other things in your body. Would recommend over the counter vitamin D3 2000 units daily to get it into and keep it in the normal range and recheck in October 2023.     Thyroid appears to be slightly underactive.  Would recommend increasing levothyroxine to 88 mcg daily and rechecking at the end of October 2023.    B12 level was good at 592.    CBC was within normal limits.

## 2023-08-01 NOTE — TELEPHONE ENCOUNTER
I tried to call patient again this evening and again only got the answering machine.  Will keep trying tomorrow.

## 2023-08-01 NOTE — TELEPHONE ENCOUNTER
I tried to reach patient at 1225 today and just got an answering machine.  Will try again later on.

## 2023-08-02 DIAGNOSIS — E03.9 HYPOTHYROIDISM, UNSPECIFIED TYPE: ICD-10-CM

## 2023-08-02 RX ORDER — LEVOTHYROXINE SODIUM 88 UG/1
88 TABLET ORAL
Qty: 90 TABLET | Refills: 1 | Status: SHIPPED | OUTPATIENT
Start: 2023-08-02 | End: 2023-11-02 | Stop reason: ALTCHOICE

## 2023-08-02 NOTE — TELEPHONE ENCOUNTER
I spoke to the patient and went over the labs and she stated that she is okay with taken a higher dose of the Levothyroxine.  Patient states that the new prescription can be sent to [email or web address].  Patient states that she will also start taking the Vitamin D3 over the counter as well.  Patient can be reached at [phone].    Thank You

## 2023-08-11 ENCOUNTER — TELEPHONE (OUTPATIENT)
Dept: PRIMARY CARE | Facility: CLINIC | Age: 58
End: 2023-08-11
Payer: COMMERCIAL

## 2023-08-11 DIAGNOSIS — E03.9 HYPOTHYROIDISM, UNSPECIFIED TYPE: Primary | ICD-10-CM

## 2023-08-11 NOTE — TELEPHONE ENCOUNTER
Patient is calling asking to speak with the doctor about the side effects she has been having since going on the Levothyroxine.  Patient states that she is up at all times of the night, and still having the pain in her neck.  Patient is asking how long she will have these issues?  Patient can be reached at [phone].    Thank You

## 2023-08-11 NOTE — TELEPHONE ENCOUNTER
Spoke with patient this afternoon and she would like to follow-up with endocrinology regarding her thyroid issues.  A referral was placed in her chart.

## 2023-09-15 ENCOUNTER — TELEPHONE (OUTPATIENT)
Dept: PRIMARY CARE | Facility: CLINIC | Age: 58
End: 2023-09-15
Payer: COMMERCIAL

## 2023-09-15 DIAGNOSIS — U07.1 COVID-19 VIRUS INFECTION: Primary | ICD-10-CM

## 2023-09-15 RX ORDER — NIRMATRELVIR AND RITONAVIR 300-100 MG
KIT ORAL
Qty: 30 TABLET | Refills: 0 | Status: SHIPPED | OUTPATIENT
Start: 2023-09-15 | End: 2023-11-02 | Stop reason: ALTCHOICE

## 2023-09-15 NOTE — TELEPHONE ENCOUNTER
Please notify patient I sent in a prescription for Paxlovid but I sent it to [facility] in [place] as [facility] does not carry it.  Thank you.

## 2023-10-23 ENCOUNTER — TELEPHONE (OUTPATIENT)
Dept: PRIMARY CARE | Facility: CLINIC | Age: 58
End: 2023-10-23
Payer: COMMERCIAL

## 2023-10-23 NOTE — TELEPHONE ENCOUNTER
"Patient experiencing light headedness, dizziness, and shoulder pain.  States that PCP's office told her to call [facility] this morning.  Patient expressing that she doesn't know if maybe her brain isn't getting enough oxygen and that she \"feels weird\".    Spoke with Ivory Jacob CNP and advised patient to go to either the ER or urgent care for evaluation.  Patient expressed understanding.  "

## 2023-10-30 DIAGNOSIS — E78.5 HYPERLIPIDEMIA, UNSPECIFIED HYPERLIPIDEMIA TYPE: ICD-10-CM

## 2023-10-30 RX ORDER — ATORVASTATIN CALCIUM 20 MG/1
20 TABLET, FILM COATED ORAL EVERY EVENING
Qty: 90 TABLET | Refills: 0 | Status: SHIPPED | OUTPATIENT
Start: 2023-10-30 | End: 2024-01-30

## 2023-11-01 ENCOUNTER — APPOINTMENT (OUTPATIENT)
Dept: PRIMARY CARE | Facility: CLINIC | Age: 58
End: 2023-11-01
Payer: COMMERCIAL

## 2023-11-02 ENCOUNTER — OFFICE VISIT (OUTPATIENT)
Dept: ENDOCRINOLOGY | Facility: CLINIC | Age: 58
End: 2023-11-02
Payer: COMMERCIAL

## 2023-11-02 ENCOUNTER — APPOINTMENT (OUTPATIENT)
Dept: ENDOCRINOLOGY | Facility: CLINIC | Age: 58
End: 2023-11-02
Payer: COMMERCIAL

## 2023-11-02 VITALS
SYSTOLIC BLOOD PRESSURE: 128 MMHG | DIASTOLIC BLOOD PRESSURE: 74 MMHG | HEIGHT: 60 IN | RESPIRATION RATE: 16 BRPM | WEIGHT: 128.4 LBS | BODY MASS INDEX: 25.21 KG/M2 | HEART RATE: 80 BPM

## 2023-11-02 DIAGNOSIS — E03.8 OTHER SPECIFIED HYPOTHYROIDISM: Primary | ICD-10-CM

## 2023-11-02 DIAGNOSIS — E04.1 THYROID NODULE: ICD-10-CM

## 2023-11-02 PROCEDURE — 99204 OFFICE O/P NEW MOD 45 MIN: CPT | Performed by: INTERNAL MEDICINE

## 2023-11-02 PROCEDURE — 1036F TOBACCO NON-USER: CPT | Performed by: INTERNAL MEDICINE

## 2023-11-02 RX ORDER — LEVOTHYROXINE SODIUM 75 UG/1
75 TABLET ORAL
COMMUNITY
End: 2023-11-02 | Stop reason: SDUPTHER

## 2023-11-02 RX ORDER — LEVOTHYROXINE SODIUM 50 UG/1
50 TABLET ORAL
COMMUNITY
End: 2023-11-02 | Stop reason: ALTCHOICE

## 2023-11-02 RX ORDER — LEVOTHYROXINE SODIUM 75 UG/1
75 TABLET ORAL
Qty: 90 TABLET | Refills: 1 | Status: SHIPPED | OUTPATIENT
Start: 2023-11-02 | End: 2023-11-27 | Stop reason: SDUPTHER

## 2023-11-02 ASSESSMENT — ENCOUNTER SYMPTOMS
FEVER: 0
NAUSEA: 0
SHORTNESS OF BREATH: 0
COUGH: 0
DIARRHEA: 0
FATIGUE: 0
PALPITATIONS: 0
VOMITING: 0
CHILLS: 0
HEADACHES: 0

## 2023-11-02 NOTE — PROGRESS NOTES
Subjective   Patient ID: Keyana Trent is a 58 y.o. female who presents for an endocrine consult for Hypothyroidism. Patient was referred by Dr. Pennington.   HPI  58-year-old referred by Dr. Pennington for evaluation of hypothyroidism.  She has been on thyroid medication for many years.  She had been on alternating 50 and 75 mcg for several years until December when she noted some weight gain and she was fatigued.  Her TSH was off at 4.2 and her dose was increased to 88 mcg.  A few months after being on this dose she started struggling with dizziness heart palpitations and lack of sleep.  She went to the emergency room 2 weeks ago and was noted to have a TSH of 0.15.  She is now back to alternating 75 and 50 mcg.  She has been feeling better this week.  Her dizziness has improved.  She has been sleeping better.  She does take her thyroid medicine in the morning on an empty stomach.  She had a thyroid ultrasound in March of this year that showed a 0.9 cm TR 4 nodule.      Review of Systems   Constitutional:  Negative for chills, fatigue and fever.   Respiratory:  Negative for cough and shortness of breath.    Cardiovascular:  Negative for chest pain and palpitations.   Gastrointestinal:  Negative for diarrhea, nausea and vomiting.   Neurological:  Negative for headaches.       Objective    11/2/2023 4:33 PM   /74   Pulse 80   Resp 16   Weight 58.2 kg (128 lb 6.4 oz)   Height 1.524 m (5')         Physical Exam  Constitutional:       Appearance: Normal appearance. She is normal weight.   HENT:      Head: Normocephalic and atraumatic.   Neck:      Thyroid: No thyroid mass, thyromegaly or thyroid tenderness.   Cardiovascular:      Rate and Rhythm: Normal rate and regular rhythm.      Heart sounds: No murmur heard.     No gallop.   Pulmonary:      Effort: Pulmonary effort is normal.      Breath sounds: Normal breath sounds.   Abdominal:      Palpations: Abdomen is soft.      Comments: benign   Neurological:      General: No  focal deficit present.      Mental Status: She is alert and oriented to person, place, and time.      Deep Tendon Reflexes: Reflexes are normal and symmetric.   Psychiatric:         Behavior: Behavior is cooperative.         Assessment/Plan   Problem List Items Addressed This Visit             ICD-10-CM    Other specified hypothyroidism - Primary E03.8    Thyroid nodule E04.1   58-year-old here for evaluation of hypothyroidism and thyroid nodule.  We discussed her course.  We reviewed her thyroid blood work in detail.  We discussed the  long-acting nature of thyroid medication.  For simplicity I would recommend continuing 75 mcg but skipping it 1 day/week to approximate her current dose.  I did order blood work to be done in 6 weeks.  We did review her thyroid ultrasound and recommendation would be for follow-up ultrasound this spring.  We will plan to see her back in 3 months.  I encouraged her to call or message with concerns or questions

## 2023-11-03 NOTE — PATIENT INSTRUCTIONS
Please take levothyroxine 75 mcg once a day but skip Sundays  Recheck blood work in 6 wks  Follow up in 3 months  Call or message with concerns or questions

## 2023-11-13 ENCOUNTER — APPOINTMENT (OUTPATIENT)
Dept: PRIMARY CARE | Facility: CLINIC | Age: 58
End: 2023-11-13
Payer: COMMERCIAL

## 2023-11-27 DIAGNOSIS — E03.8 OTHER SPECIFIED HYPOTHYROIDISM: ICD-10-CM

## 2023-11-27 RX ORDER — LEVOTHYROXINE SODIUM 75 UG/1
TABLET ORAL
Qty: 90 TABLET | Refills: 1 | Status: SHIPPED | OUTPATIENT
Start: 2023-11-27 | End: 2024-03-21 | Stop reason: SDUPTHER

## 2023-12-27 ENCOUNTER — LAB (OUTPATIENT)
Dept: LAB | Facility: LAB | Age: 58
End: 2023-12-27
Payer: COMMERCIAL

## 2023-12-27 DIAGNOSIS — E03.8 OTHER SPECIFIED HYPOTHYROIDISM: ICD-10-CM

## 2023-12-27 DIAGNOSIS — E03.8 OTHER SPECIFIED HYPOTHYROIDISM: Primary | ICD-10-CM

## 2023-12-27 LAB
T3FREE SERPL-MCNC: 3.4 PG/ML (ref 2.3–4.2)
T4 FREE SERPL-MCNC: 1.08 NG/DL (ref 0.61–1.12)
TSH SERPL-ACNC: 0.92 MIU/L (ref 0.44–3.98)

## 2023-12-27 PROCEDURE — 84481 FREE ASSAY (FT-3): CPT

## 2023-12-27 PROCEDURE — 36415 COLL VENOUS BLD VENIPUNCTURE: CPT

## 2023-12-27 PROCEDURE — 84439 ASSAY OF FREE THYROXINE: CPT

## 2023-12-27 PROCEDURE — 84443 ASSAY THYROID STIM HORMONE: CPT

## 2024-01-29 ENCOUNTER — TELEPHONE (OUTPATIENT)
Dept: PRIMARY CARE | Facility: CLINIC | Age: 59
End: 2024-01-29
Payer: COMMERCIAL

## 2024-01-29 DIAGNOSIS — E78.5 HYPERLIPIDEMIA, UNSPECIFIED HYPERLIPIDEMIA TYPE: ICD-10-CM

## 2024-01-29 DIAGNOSIS — E03.9 HYPOTHYROIDISM, UNSPECIFIED TYPE: Primary | ICD-10-CM

## 2024-01-30 RX ORDER — ATORVASTATIN CALCIUM 20 MG/1
20 TABLET, FILM COATED ORAL EVERY EVENING
Qty: 90 TABLET | Refills: 0 | Status: SHIPPED | OUTPATIENT
Start: 2024-01-30

## 2024-01-30 NOTE — TELEPHONE ENCOUNTER
Pharmacy requests prescription below    Last Office Visit: 7/27/2023     Requested Prescriptions     Pending Prescriptions Disp Refills    atorvastatin (Lipitor) 20 mg tablet [Pharmacy Med Name: ATORVASTATIN TABS 20MG] 30 tablet 0     Sig: TAKE 1 TABLET DAILY IN THE EVENING     Patient due for apt. Please contact patient and schedule apt.  30 day supply sent to Dr. Pennington for review.

## 2024-01-31 ENCOUNTER — TELEPHONE (OUTPATIENT)
Dept: PRIMARY CARE | Facility: CLINIC | Age: 59
End: 2024-01-31

## 2024-02-03 ENCOUNTER — LAB (OUTPATIENT)
Dept: LAB | Facility: LAB | Age: 59
End: 2024-02-03
Payer: COMMERCIAL

## 2024-02-03 DIAGNOSIS — E03.8 OTHER SPECIFIED HYPOTHYROIDISM: ICD-10-CM

## 2024-02-03 DIAGNOSIS — E03.9 HYPOTHYROIDISM, UNSPECIFIED TYPE: ICD-10-CM

## 2024-02-03 LAB
T3FREE SERPL-MCNC: 3.2 PG/ML (ref 2.3–4.2)
T4 FREE SERPL-MCNC: 1.02 NG/DL (ref 0.61–1.12)
TSH SERPL-ACNC: 1.62 MIU/L (ref 0.44–3.98)

## 2024-02-03 PROCEDURE — 84481 FREE ASSAY (FT-3): CPT

## 2024-02-03 PROCEDURE — 84443 ASSAY THYROID STIM HORMONE: CPT

## 2024-02-03 PROCEDURE — 36415 COLL VENOUS BLD VENIPUNCTURE: CPT

## 2024-02-03 PROCEDURE — 84439 ASSAY OF FREE THYROXINE: CPT

## 2024-02-05 ENCOUNTER — OFFICE VISIT (OUTPATIENT)
Dept: ENDOCRINOLOGY | Facility: CLINIC | Age: 59
End: 2024-02-05
Payer: COMMERCIAL

## 2024-02-05 VITALS
BODY MASS INDEX: 25.29 KG/M2 | RESPIRATION RATE: 16 BRPM | DIASTOLIC BLOOD PRESSURE: 72 MMHG | WEIGHT: 128.8 LBS | SYSTOLIC BLOOD PRESSURE: 110 MMHG | HEIGHT: 60 IN | HEART RATE: 72 BPM

## 2024-02-05 DIAGNOSIS — E03.8 OTHER SPECIFIED HYPOTHYROIDISM: Primary | ICD-10-CM

## 2024-02-05 DIAGNOSIS — E04.1 THYROID NODULE: ICD-10-CM

## 2024-02-05 PROCEDURE — 99213 OFFICE O/P EST LOW 20 MIN: CPT | Performed by: INTERNAL MEDICINE

## 2024-02-05 PROCEDURE — 1036F TOBACCO NON-USER: CPT | Performed by: INTERNAL MEDICINE

## 2024-02-05 ASSESSMENT — ENCOUNTER SYMPTOMS
HEADACHES: 0
COUGH: 0
VOMITING: 0
FATIGUE: 0
NAUSEA: 0
SHORTNESS OF BREATH: 0
CHILLS: 0
PALPITATIONS: 0
FEVER: 0
DIARRHEA: 0

## 2024-02-05 NOTE — PATIENT INSTRUCTIONS
Continue current thyroid med dose  Schedule ultrasound  Follow up in one year if ultrasound is stable

## 2024-02-05 NOTE — PROGRESS NOTES
Endocrinology: Follow up visit  Subjective   Patient ID: Keyana Trent is a 58 y.o. female who presents for Hypothyroidism.    PCP: Teddy Pennington,     HPI  Since last visit adjusted t4 to 75 x6 instead of alternating doses.  Levels look great.   Feels fine.  No complaints.  Concerned with nodule we discussed last time, due for follow up us.      Review of Systems   Constitutional:  Negative for chills, fatigue and fever.   Respiratory:  Negative for cough and shortness of breath.    Cardiovascular:  Negative for chest pain and palpitations.   Gastrointestinal:  Negative for diarrhea, nausea and vomiting.   Neurological:  Negative for headaches.       Patient Active Problem List   Diagnosis    Anal fissure    Bilateral lower extremity edema    BPV (benign positional vertigo)    Chronic constipation    Chronic low back pain    Dizziness    Fever    Generalized abdominal pain    Hematuria    Hemorrhoidal skin tag    Hyperglycemia    Hyperlipidemia    Other specified hypothyroidism    Imbalance    Incomplete right bundle branch block (RBBB)    Influenza B    Iron deficiency    Left lower quadrant abdominal pain of unknown etiology    Left shoulder pain    Malaise and fatigue    Motion sickness    Neck pain    Pain and swelling of left lower leg    Pain of right lateral upper thigh    Palpitation    Prediabetes    Rhinorrhea    Right wrist pain    Sinusitis    Swelling of left lower extremity    Tendinitis of right wrist    Tinnitus    UTI (urinary tract infection)    Vaginitis    Varicose veins with pain    Vitamin B 12 deficiency    Vitamin D deficiency    Vertigo    Thyromegaly    Encounter for health maintenance examination    Family history of diabetes mellitus (DM)    Multiple thyroid nodules    Thyroid nodule        Home Meds:  Current Outpatient Medications   Medication Instructions    atorvastatin (LIPITOR) 20 mg, oral, Every evening    cycloSPORINE (Restasis) 0.05 % ophthalmic emulsion 1 drop, Both Eyes, 2  "times daily    levothyroxine (Synthroid, Levoxyl) 75 mcg tablet Take one tablet daily except sundays        Allergies   Allergen Reactions    Azithromycin Diarrhea and Other     Stomach cramps    Ferralet 90 Nausea Only        Objective   Vitals:    02/05/24 1039   BP: 110/72   Pulse: 72   Resp: 16      Vitals:    02/05/24 1039   Weight: 58.4 kg (128 lb 12.8 oz)      Body mass index is 25.15 kg/m².   Physical Exam  Constitutional:       Appearance: Normal appearance. She is normal weight.   HENT:      Head: Normocephalic and atraumatic.   Neck:      Thyroid: No thyroid mass, thyromegaly or thyroid tenderness.   Cardiovascular:      Rate and Rhythm: Normal rate and regular rhythm.      Heart sounds: No murmur heard.     No gallop.   Pulmonary:      Effort: Pulmonary effort is normal.      Breath sounds: Normal breath sounds.   Abdominal:      Palpations: Abdomen is soft.      Comments: benign   Neurological:      General: No focal deficit present.      Mental Status: She is alert and oriented to person, place, and time.      Deep Tendon Reflexes: Reflexes are normal and symmetric.   Psychiatric:         Behavior: Behavior is cooperative.         Labs:  Lab Results   Component Value Date    HGBA1C 6.1 (A) 07/28/2023    TSH 1.62 02/03/2024    FREET4 1.02 02/03/2024      No results found for: \"PR1\", \"THYROIDPAB\", \"TSI\"     Assessment/Plan   Problem List Items Addressed This Visit       Other specified hypothyroidism - Primary    Thyroid nodule    Relevant Orders    US thyroid     Hypothyroidism:  Levels look good. Continue current dose  Thyroid nodule:  Due for yearly us: ordered  Ok to follow up in one year if stable      Electronically signed by:  Nathaly Hawkins MD 02/05/24 11:07 AM                "

## 2024-03-18 ENCOUNTER — HOSPITAL ENCOUNTER (OUTPATIENT)
Dept: RADIOLOGY | Facility: CLINIC | Age: 59
Discharge: HOME | End: 2024-03-18
Payer: COMMERCIAL

## 2024-03-18 DIAGNOSIS — E04.1 THYROID NODULE: ICD-10-CM

## 2024-03-18 PROCEDURE — 76536 US EXAM OF HEAD AND NECK: CPT

## 2024-03-18 PROCEDURE — 76536 US EXAM OF HEAD AND NECK: CPT | Performed by: RADIOLOGY

## 2024-03-21 DIAGNOSIS — E03.8 OTHER SPECIFIED HYPOTHYROIDISM: ICD-10-CM

## 2024-03-21 RX ORDER — LEVOTHYROXINE SODIUM 75 UG/1
TABLET ORAL
Qty: 90 TABLET | Refills: 3 | Status: SHIPPED | OUTPATIENT
Start: 2024-03-21

## 2024-03-25 ENCOUNTER — TELEPHONE (OUTPATIENT)
Dept: PRIMARY CARE | Facility: CLINIC | Age: 59
End: 2024-03-25
Payer: COMMERCIAL

## 2024-03-26 ENCOUNTER — OFFICE VISIT (OUTPATIENT)
Dept: PRIMARY CARE | Facility: CLINIC | Age: 59
End: 2024-03-26
Payer: COMMERCIAL

## 2024-03-26 ENCOUNTER — LAB (OUTPATIENT)
Dept: LAB | Facility: LAB | Age: 59
End: 2024-03-26
Payer: COMMERCIAL

## 2024-03-26 VITALS
BODY MASS INDEX: 25.58 KG/M2 | TEMPERATURE: 98 F | RESPIRATION RATE: 16 BRPM | SYSTOLIC BLOOD PRESSURE: 130 MMHG | OXYGEN SATURATION: 95 % | DIASTOLIC BLOOD PRESSURE: 84 MMHG | WEIGHT: 131 LBS | HEART RATE: 68 BPM

## 2024-03-26 DIAGNOSIS — E53.8 VITAMIN B 12 DEFICIENCY: Primary | ICD-10-CM

## 2024-03-26 DIAGNOSIS — E61.1 IRON DEFICIENCY: ICD-10-CM

## 2024-03-26 DIAGNOSIS — E53.8 VITAMIN B 12 DEFICIENCY: ICD-10-CM

## 2024-03-26 DIAGNOSIS — E55.9 VITAMIN D DEFICIENCY: ICD-10-CM

## 2024-03-26 LAB
25(OH)D3 SERPL-MCNC: 27 NG/ML (ref 30–100)
ERYTHROCYTE [DISTWIDTH] IN BLOOD BY AUTOMATED COUNT: 12.8 % (ref 11.5–14.5)
HCT VFR BLD AUTO: 42 % (ref 36–46)
HGB BLD-MCNC: 13.4 G/DL (ref 12–16)
IRON SATN MFR SERPL: 31 % (ref 25–45)
IRON SERPL-MCNC: 115 UG/DL (ref 35–150)
MCH RBC QN AUTO: 29.6 PG (ref 26–34)
MCHC RBC AUTO-ENTMCNC: 31.9 G/DL (ref 32–36)
MCV RBC AUTO: 93 FL (ref 80–100)
NRBC BLD-RTO: 0 /100 WBCS (ref 0–0)
PLATELET # BLD AUTO: 320 X10*3/UL (ref 150–450)
RBC # BLD AUTO: 4.53 X10*6/UL (ref 4–5.2)
TIBC SERPL-MCNC: 369 UG/DL (ref 240–445)
UIBC SERPL-MCNC: 254 UG/DL (ref 110–370)
VIT B12 SERPL-MCNC: 1963 PG/ML (ref 211–911)
WBC # BLD AUTO: 5.9 X10*3/UL (ref 4.4–11.3)

## 2024-03-26 PROCEDURE — 82607 VITAMIN B-12: CPT

## 2024-03-26 PROCEDURE — 36415 COLL VENOUS BLD VENIPUNCTURE: CPT

## 2024-03-26 PROCEDURE — 83540 ASSAY OF IRON: CPT

## 2024-03-26 PROCEDURE — 83550 IRON BINDING TEST: CPT

## 2024-03-26 PROCEDURE — 85027 COMPLETE CBC AUTOMATED: CPT

## 2024-03-26 PROCEDURE — 99213 OFFICE O/P EST LOW 20 MIN: CPT | Performed by: FAMILY MEDICINE

## 2024-03-26 PROCEDURE — 82306 VITAMIN D 25 HYDROXY: CPT

## 2024-03-26 NOTE — PROGRESS NOTES
Subjective   Patient ID: Keyana Trent is a 58 y.o. female who presents for Mass (Noticed yesterday morning that her neck was really swollen on the right side but today it has gone down a bit. But her endo specialist states there is something in her throat that is concerning. No pain. Some fatigue and headache yesterday. Wants to check her b12. ).    HPI  Patient comes in today complaining of a lump in her neck.  She states that yesterday her neck was really swollen on the right side and she took a picture which she showed me today.  Without doing anything today her neck is significantly improved.  She states that she talk to her endocrinologist about this and was told by her endocrinologist that everything is okay with her thyroid.  In fact she had an ultrasound of the thyroid just last week and as part of that exam there is a comment that there is no worrisome appearing lymph nodes in the neck bilaterally.    Review of Systems   All other systems reviewed and are negative.      Objective   Vitals:  /84   Pulse 68   Temp 36.7 °C (98 °F)   Resp 16   Wt 59.4 kg (131 lb)   LMP  (LMP Unknown)   SpO2 95%   BMI 25.58 kg/m²     Physical Exam  Vitals reviewed.   Constitutional:       Appearance: She is well-developed.   HENT:      Head: Normocephalic and atraumatic.      Right Ear: Tympanic membrane, ear canal and external ear normal.      Left Ear: Tympanic membrane, ear canal and external ear normal.      Nose: Nose normal.      Mouth/Throat:      Mouth: Mucous membranes are moist.      Pharynx: Oropharynx is clear.   Eyes:      Extraocular Movements: Extraocular movements intact.      Conjunctiva/sclera: Conjunctivae normal.      Pupils: Pupils are equal, round, and reactive to light.   Neck:      Comments: No significant swelling noted.  Possible small lipoma above the manubrium  Cardiovascular:      Rate and Rhythm: Normal rate and regular rhythm.      Heart sounds: Normal heart sounds. No murmur  heard.  Pulmonary:      Effort: Pulmonary effort is normal.      Breath sounds: Normal breath sounds. No wheezing.   Abdominal:      General: Abdomen is flat. Bowel sounds are normal.      Palpations: Abdomen is soft.   Musculoskeletal:         General: Normal range of motion.      Cervical back: Normal range of motion and neck supple.   Skin:     General: Skin is warm and dry.   Neurological:      General: No focal deficit present.      Mental Status: She is alert and oriented to person, place, and time. Mental status is at baseline.   Psychiatric:         Mood and Affect: Mood normal.         Behavior: Behavior normal.         Thought Content: Thought content normal.         Judgment: Judgment normal.       Assessment/Plan   Problem List Items Addressed This Visit       Iron deficiency    Relevant Orders    Iron and TIBC (Completed)    Vitamin B 12 deficiency - Primary    Relevant Orders    Vitamin B12 (Completed)    CBC (Completed)    Vitamin D deficiency    Relevant Orders    Vitamin D 25-Hydroxy,Total (for eval of Vitamin D levels) (Completed)   Will contact patient with lab results when available.  Observation for now, reassurance given, no treatment rendered at this time. Patient is to contact me if symptoms return, do not go away or condition worsens.  Medication list reconciled.  Thank you for visiting today!      Professional services: Some of this note was completed using Dragon voice technology and sometimes the software misinterprets words. This may include unintended errors with respect to translation of words, typographical errors or grammar errors which may not have been identified prior to finalization of the chart note. Please take this into account when reading the note. Thank you.               Teddy Pennington DO 03/30/24 12:15 PM

## 2024-03-29 ENCOUNTER — TELEPHONE (OUTPATIENT)
Dept: PRIMARY CARE | Facility: CLINIC | Age: 59
End: 2024-03-29
Payer: COMMERCIAL

## 2024-03-29 NOTE — RESULT ENCOUNTER NOTE
Please notify patient of high B12 of 1963, it should not be higher than 911.  She can stop B12 supplements for now.    Her vitamin D level was low at 27.  It should be between 30 and 100 the closer to 50 the better. It is an important vitamin for your heart, lungs, immune system and bones among other things in your body. Would recommend over the counter vitamin D3 2000 units daily to get it into and keep it in the normal range and recheck in August 2024.     Her iron and the rest of her labs were good.

## 2024-03-29 NOTE — TELEPHONE ENCOUNTER
Patient called and she would like to go over the results of her lab work with you.  Patient can be reached at [phone].        Thank You

## 2024-03-29 NOTE — TELEPHONE ENCOUNTER
----- Message from Teddy Pennington DO sent at 3/29/2024  6:46 AM EDT -----  Please notify patient of high B12 of 1963, it should not be higher than 911.  She can stop B12 supplements for now.    Her vitamin D level was low at 27.  It should be between 30 and 100 the closer to 50 the better. It is an important vitamin for your heart, lungs, immune system and bones among other things in your body. Would recommend over the counter vitamin D3 2000 units daily to get it into and keep it in the normal range and recheck in August 2024.     Her iron and the rest of her labs were good.

## 2025-02-10 ENCOUNTER — APPOINTMENT (OUTPATIENT)
Dept: ENDOCRINOLOGY | Facility: CLINIC | Age: 60
End: 2025-02-10
Payer: COMMERCIAL

## 2025-03-10 DIAGNOSIS — E03.8 OTHER SPECIFIED HYPOTHYROIDISM: ICD-10-CM

## 2025-03-10 RX ORDER — LEVOTHYROXINE SODIUM 75 UG/1
TABLET ORAL
Qty: 78 TABLET | Refills: 3 | OUTPATIENT
Start: 2025-03-10